# Patient Record
Sex: FEMALE | Race: WHITE | Employment: FULL TIME | URBAN - METROPOLITAN AREA
[De-identification: names, ages, dates, MRNs, and addresses within clinical notes are randomized per-mention and may not be internally consistent; named-entity substitution may affect disease eponyms.]

---

## 2020-11-16 ENCOUNTER — TRANSCRIBE ORDER (OUTPATIENT)
Dept: SCHEDULING | Age: 45
End: 2020-11-16

## 2020-11-16 DIAGNOSIS — Z12.31 VISIT FOR SCREENING MAMMOGRAM: Primary | ICD-10-CM

## 2020-12-09 ENCOUNTER — HOSPITAL ENCOUNTER (OUTPATIENT)
Dept: MAMMOGRAPHY | Age: 45
Discharge: HOME | End: 2020-12-09
Attending: OBSTETRICS & GYNECOLOGY
Payer: COMMERCIAL

## 2020-12-09 DIAGNOSIS — Z12.31 VISIT FOR SCREENING MAMMOGRAM: ICD-10-CM

## 2020-12-09 PROCEDURE — 77067 SCR MAMMO BI INCL CAD: CPT

## 2020-12-15 ENCOUNTER — APPOINTMENT (RX ONLY)
Dept: URBAN - METROPOLITAN AREA CLINIC 349 | Facility: CLINIC | Age: 45
Setting detail: DERMATOLOGY
End: 2020-12-15

## 2020-12-15 DIAGNOSIS — L71.8 OTHER ROSACEA: ICD-10-CM

## 2020-12-15 PROCEDURE — 99202 OFFICE O/P NEW SF 15 MIN: CPT

## 2020-12-15 PROCEDURE — ? PRESCRIPTION

## 2020-12-15 PROCEDURE — ? COUNSELING

## 2020-12-15 PROCEDURE — ? OTHER

## 2020-12-15 RX ORDER — DOXYCYCLINE 100 MG/1
CAPSULE ORAL
Qty: 45 | Refills: 1 | Status: ERX | COMMUNITY
Start: 2020-12-15

## 2020-12-15 RX ORDER — IVERMECTIN 10 MG/G
CREAM TOPICAL
Qty: 1 | Refills: 2 | Status: ERX | COMMUNITY
Start: 2020-12-15

## 2020-12-15 RX ADMIN — DOXYCYCLINE: 100 CAPSULE ORAL at 00:00

## 2020-12-15 RX ADMIN — IVERMECTIN: 10 CREAM TOPICAL at 00:00

## 2020-12-15 NOTE — PROCEDURE: OTHER
Detail Level: Simple
Note Text (......Xxx Chief Complaint.): This diagnosis correlates with the
Other (Free Text): Once Patient has finished oral Doxycline will switch to topical Minocycline Zilixi foam\\nStart Soolantra at night and sampled\\nCerave moistuizer and gentle cleanser sampled

## 2021-01-15 ENCOUNTER — RX ONLY (OUTPATIENT)
Age: 46
Setting detail: RX ONLY
End: 2021-01-15

## 2021-01-15 RX ORDER — METRONIDAZOLE 7.5 MG/G
CREAM TOPICAL
Qty: 1 | Refills: 4 | Status: ERX | COMMUNITY
Start: 2021-01-15

## 2021-02-08 ENCOUNTER — APPOINTMENT (RX ONLY)
Dept: URBAN - METROPOLITAN AREA CLINIC 349 | Facility: CLINIC | Age: 46
Setting detail: DERMATOLOGY
End: 2021-02-08

## 2021-02-08 DIAGNOSIS — L71.8 OTHER ROSACEA: ICD-10-CM | Status: IMPROVED

## 2021-02-08 PROCEDURE — ? COUNSELING

## 2021-02-08 PROCEDURE — ? PRESCRIPTION MEDICATION MANAGEMENT

## 2021-02-08 PROCEDURE — ? PRESCRIPTION

## 2021-02-08 PROCEDURE — 99213 OFFICE O/P EST LOW 20 MIN: CPT

## 2021-02-08 RX ORDER — MINOCYCLINE 15 MG/G
AEROSOL, FOAM TOPICAL
Qty: 1 | Refills: 4 | Status: ERX | COMMUNITY
Start: 2021-02-08

## 2021-02-08 RX ADMIN — MINOCYCLINE: 15 AEROSOL, FOAM TOPICAL at 00:00

## 2021-02-08 ASSESSMENT — LOCATION ZONE DERM: LOCATION ZONE: NOSE

## 2021-02-08 ASSESSMENT — LOCATION DETAILED DESCRIPTION DERM: LOCATION DETAILED: NASAL DORSUM

## 2021-02-08 ASSESSMENT — LOCATION SIMPLE DESCRIPTION DERM: LOCATION SIMPLE: NOSE

## 2021-02-08 NOTE — PROCEDURE: PRESCRIPTION MEDICATION MANAGEMENT
Samples Given: Rhofade sampled for redness\\nZilixi for flares
Plan: Will call in Doxycline if she has bad flare
Detail Level: Simple
Continue Regimen: Metronidazole
Render In Strict Bullet Format?: No

## 2021-10-19 ENCOUNTER — APPOINTMENT (RX ONLY)
Dept: URBAN - METROPOLITAN AREA CLINIC 349 | Facility: CLINIC | Age: 46
Setting detail: DERMATOLOGY
End: 2021-10-19

## 2021-10-19 DIAGNOSIS — L71.8 OTHER ROSACEA: ICD-10-CM | Status: WORSENING

## 2021-10-19 PROCEDURE — ? PRESCRIPTION

## 2021-10-19 PROCEDURE — ? OTHER

## 2021-10-19 PROCEDURE — ? PRESCRIPTION MEDICATION MANAGEMENT

## 2021-10-19 PROCEDURE — 99213 OFFICE O/P EST LOW 20 MIN: CPT

## 2021-10-19 PROCEDURE — ? COUNSELING

## 2021-10-19 RX ORDER — METRONIDAZOLE 7.5 MG/G
CREAM TOPICAL
Qty: 45 | Refills: 3 | Status: ERX | COMMUNITY
Start: 2021-10-19

## 2021-10-19 RX ORDER — DOXYCYCLINE 50 MG/1
CAPSULE ORAL
Qty: 45 | Refills: 0 | Status: ERX | COMMUNITY
Start: 2021-10-19

## 2021-10-19 RX ORDER — MINOCYCLINE 15 MG/G
AEROSOL, FOAM TOPICAL
Qty: 30 | Refills: 4 | Status: ERX | COMMUNITY
Start: 2021-10-19

## 2021-10-19 RX ADMIN — DOXYCYCLINE: 50 CAPSULE ORAL at 00:00

## 2021-10-19 RX ADMIN — MINOCYCLINE: 15 AEROSOL, FOAM TOPICAL at 00:00

## 2021-10-19 RX ADMIN — METRONIDAZOLE: 7.5 CREAM TOPICAL at 00:00

## 2021-10-19 ASSESSMENT — LOCATION DETAILED DESCRIPTION DERM: LOCATION DETAILED: NASAL DORSUM

## 2021-10-19 ASSESSMENT — LOCATION ZONE DERM: LOCATION ZONE: NOSE

## 2021-10-19 ASSESSMENT — LOCATION SIMPLE DESCRIPTION DERM: LOCATION SIMPLE: NOSE

## 2021-10-19 NOTE — PROCEDURE: PRESCRIPTION MEDICATION MANAGEMENT
Plan: Will call in Doxycline if she has bad flare
Detail Level: Simple
Continue Regimen: Metronidazole
Render In Strict Bullet Format?: No

## 2021-10-19 NOTE — PROCEDURE: OTHER
Note Text (......Xxx Chief Complaint.): This diagnosis correlates with the
Detail Level: Zone
Other (Free Text): Patient did not get zilxi.  Has not used rhofade, Redness does not bother her very much.
Render Risk Assessment In Note?: yes

## 2023-01-31 ENCOUNTER — APPOINTMENT (RX ONLY)
Dept: URBAN - METROPOLITAN AREA CLINIC 329 | Facility: CLINIC | Age: 48
Setting detail: DERMATOLOGY
End: 2023-01-31

## 2023-01-31 DIAGNOSIS — L82.1 OTHER SEBORRHEIC KERATOSIS: ICD-10-CM

## 2023-01-31 DIAGNOSIS — L81.4 OTHER MELANIN HYPERPIGMENTATION: ICD-10-CM

## 2023-01-31 DIAGNOSIS — D22 MELANOCYTIC NEVI: ICD-10-CM

## 2023-01-31 PROBLEM — D22.5 MELANOCYTIC NEVI OF TRUNK: Status: ACTIVE | Noted: 2023-01-31

## 2023-01-31 PROCEDURE — ? TREATMENT REGIMEN

## 2023-01-31 PROCEDURE — 99213 OFFICE O/P EST LOW 20 MIN: CPT

## 2023-01-31 PROCEDURE — ? FULL BODY SKIN EXAM

## 2023-01-31 PROCEDURE — ? COUNSELING

## 2023-01-31 ASSESSMENT — LOCATION ZONE DERM
LOCATION ZONE: FACE
LOCATION ZONE: TRUNK
LOCATION ZONE: ARM

## 2023-01-31 ASSESSMENT — LOCATION DETAILED DESCRIPTION DERM
LOCATION DETAILED: LEFT PROXIMAL POSTERIOR UPPER ARM
LOCATION DETAILED: LEFT LATERAL ABDOMEN
LOCATION DETAILED: RIGHT PROXIMAL POSTERIOR UPPER ARM
LOCATION DETAILED: RIGHT SUPERIOR LATERAL UPPER BACK
LOCATION DETAILED: LEFT LATERAL SUPERIOR CHEST
LOCATION DETAILED: RIGHT MEDIAL BREAST 5-6:00 REGION
LOCATION DETAILED: LEFT RIB CAGE
LOCATION DETAILED: LEFT SUPERIOR MEDIAL BUCCAL CHEEK

## 2023-01-31 ASSESSMENT — LOCATION SIMPLE DESCRIPTION DERM
LOCATION SIMPLE: RIGHT BREAST
LOCATION SIMPLE: RIGHT POSTERIOR UPPER ARM
LOCATION SIMPLE: ABDOMEN
LOCATION SIMPLE: LEFT POSTERIOR UPPER ARM
LOCATION SIMPLE: RIGHT BACK
LOCATION SIMPLE: CHEST
LOCATION SIMPLE: LEFT CHEEK

## 2023-01-31 NOTE — PROCEDURE: MIPS QUALITY
Quality 394b: Td/Tdap Immunizations For Adolescents: Patient had one tetanus, diphtheria toxoids and acellular pertussis vaccine (Tdap) on or between the patient's 10th and 13th birthdays.
Detail Level: Detailed
Quality 130: Documentation Of Current Medications In The Medical Record: Current Medications Documented
Quality 394c: Hpv Vaccine For Adolescents: Patient has had at least two HPV vaccines (with at least 146 days between the two) OR three HPV vaccines on or between the patient’s 9th and 13th birthdays.
Quality 402: Tobacco Use And Help With Quitting Among Adolescents: Patient screened for tobacco and never smoked
Quality 110: Preventive Care And Screening: Influenza Immunization: Influenza Immunization Administered during Influenza season
Quality 394a: Meningococcal Immunizations For Adolescents: Patient had one dose of meningococcal vaccine (serogroups A, C, W, Y) on or between the patient's 11th and 13th birthdays.
Quality 431: Preventive Care And Screening: Unhealthy Alcohol Use - Screening: Patient not identified as an unhealthy alcohol user when screened for unhealthy alcohol use using a systematic screening method

## 2023-02-23 ENCOUNTER — HOSPITAL ENCOUNTER (OUTPATIENT)
Dept: MAMMOGRAPHY | Age: 48
End: 2023-02-23
Payer: COMMERCIAL

## 2023-02-23 ENCOUNTER — HOSPITAL ENCOUNTER (OUTPATIENT)
Dept: MAMMOGRAPHY | Age: 48
Discharge: HOME | End: 2023-02-23
Payer: COMMERCIAL

## 2023-02-23 DIAGNOSIS — R92.8 ABNORMAL SCREENING MAMMOGRAM: ICD-10-CM

## 2023-02-23 PROCEDURE — 76642 ULTRASOUND BREAST LIMITED: CPT

## 2023-02-23 PROCEDURE — G0279 TOMOSYNTHESIS, MAMMO: HCPCS

## 2023-02-23 PROCEDURE — 77065 DX MAMMO INCL CAD UNI: CPT

## 2023-03-01 ENCOUNTER — HOSPITAL ENCOUNTER (OUTPATIENT)
Dept: MAMMOGRAPHY | Age: 48
Discharge: HOME | End: 2023-03-04
Payer: COMMERCIAL

## 2023-03-01 VITALS — HEART RATE: 76 BPM | DIASTOLIC BLOOD PRESSURE: 65 MMHG | SYSTOLIC BLOOD PRESSURE: 116 MMHG

## 2023-03-01 DIAGNOSIS — R92.8 ABNORMAL FINDING ON MAMMOGRAPHY: ICD-10-CM

## 2023-03-01 DIAGNOSIS — R92.8 ABNORMAL ULTRASOUND OF BREAST: ICD-10-CM

## 2023-03-01 PROCEDURE — A4648 IMPLANTABLE TISSUE MARKER: HCPCS

## 2023-03-01 PROCEDURE — 88305 TISSUE EXAM BY PATHOLOGIST: CPT

## 2023-03-01 PROCEDURE — 77065 DX MAMMO INCL CAD UNI: CPT

## 2023-03-01 PROCEDURE — 2500000003 HC RX 250 WO HCPCS: Performed by: NURSE PRACTITIONER

## 2023-03-01 RX ORDER — LIDOCAINE HYDROCHLORIDE 10 MG/ML
5 INJECTION, SOLUTION INFILTRATION; PERINEURAL ONCE
Status: COMPLETED | OUTPATIENT
Start: 2023-03-01 | End: 2023-03-01

## 2023-03-01 RX ADMIN — LIDOCAINE HYDROCHLORIDE 5 ML: 10 INJECTION, SOLUTION INFILTRATION; PERINEURAL at 08:22

## 2023-03-01 ASSESSMENT — PAIN SCALES - GENERAL: PAINLEVEL_OUTOF10: 5

## 2023-03-02 ENCOUNTER — TELEPHONE (OUTPATIENT)
Dept: MAMMOGRAPHY | Age: 48
End: 2023-03-02

## 2023-03-02 NOTE — TELEPHONE ENCOUNTER
I spoke with the patient about the results of her recent left breast biopsy, pathology came back as a papilloma. The patient was given the radiologist recommendation to follow up with a surgeon about the discussion of having this area removed.  The patient is scheduled to see Dr. Edmond Louie on 3-8-23 @ 8:30 am.

## 2023-03-08 ENCOUNTER — OFFICE VISIT (OUTPATIENT)
Dept: SURGERY | Age: 48
End: 2023-03-08
Payer: COMMERCIAL

## 2023-03-08 VITALS — BODY MASS INDEX: 22.53 KG/M2 | HEIGHT: 64 IN | WEIGHT: 132 LBS

## 2023-03-08 DIAGNOSIS — D24.2 INTRADUCTAL PAPILLOMA OF LEFT BREAST: Primary | ICD-10-CM

## 2023-03-08 PROCEDURE — 99203 OFFICE O/P NEW LOW 30 MIN: CPT | Performed by: SURGERY

## 2023-03-08 ASSESSMENT — ENCOUNTER SYMPTOMS
EYES NEGATIVE: 1
RESPIRATORY NEGATIVE: 1
GASTROINTESTINAL NEGATIVE: 1
ALLERGIC/IMMUNOLOGIC NEGATIVE: 1

## 2023-03-08 NOTE — PROGRESS NOTES
3/8/2023    Sasha Mckay  MRN: [id number]      CHIEF COMPLAINT: Left breast papilloma. PRIMARY CARE PHYSICIAN: Karin Akins MD      HISTORY:  Underwent breast imaging finding a left breast mass at 4:00. This measured 1.7 x 0.9 x 0.9 cm. Biopsy was done showing fibrocystic changes and a papilloma. DIAGNOSIS        A:  \" LEFT BREAST, 4:00 POSITION, 4 CM FROM NIPPLE, CORE BIOPSY\":         FIBROCYSTIC MASTOPATHY HAVING INTRADUCTAL PAPILLOMA   She denies palpable breast masses or nipple d/c.   REVIEW OF SYSTEMS:  Review of Systems   Constitutional: Negative. HENT: Negative. Eyes: Negative. Respiratory: Negative. Cardiovascular: Negative. Gastrointestinal: Negative. Endocrine: Negative. Genitourinary: Negative. Musculoskeletal: Negative. Skin: Negative. Allergic/Immunologic: Negative. Neurological: Negative. Hematological: Negative. Psychiatric/Behavioral: Negative. History reviewed. No pertinent past medical history. No current outpatient medications on file. No current facility-administered medications for this visit. Family History   Problem Relation Age of Onset    Breast Cancer Maternal Grandmother        Social History     Socioeconomic History    Marital status:      Spouse name: None    Number of children: None    Years of education: None    Highest education level: None         PHYSICAL EXAMINATION:  Physical Exam  Constitutional:       Appearance: Normal appearance. HENT:      Head: Normocephalic and atraumatic. Nose: Nose normal.      Mouth/Throat:      Mouth: Mucous membranes are moist.   Eyes:      Extraocular Movements: Extraocular movements intact. Pupils: Pupils are equal, round, and reactive to light. Cardiovascular:      Rate and Rhythm: Normal rate and regular rhythm. Pulmonary:      Effort: Pulmonary effort is normal.   Chest:   Breasts:     Right: No mass or nipple discharge. Left: No mass or nipple discharge. Comments: Postbiopsy bruising on the left breast 4:00. Abdominal:      Palpations: Abdomen is soft. Musculoskeletal:         General: Normal range of motion. Cervical back: Normal range of motion and neck supple. Lymphadenopathy:      Upper Body:      Right upper body: No supraclavicular or axillary adenopathy. Left upper body: No supraclavicular or axillary adenopathy. Skin:     General: Skin is warm and dry. Neurological:      General: No focal deficit present. Mental Status: She is alert and oriented to person, place, and time. Sensory: Sensation is intact. Psychiatric:         Mood and Affect: Mood normal.         Behavior: Behavior normal.         Thought Content: Thought content normal.        Ht 5' 3.5\" (1.613 m)   Wt 132 lb (59.9 kg)   BMI 23.02 kg/m²       STUDIES:  Mammogram Result (most recent): MAMMOGRAM POST BX CLIP PLACEMENT LEFT 03/01/2023    Narrative  POSTBIOPSY MAMMOGRAM, 3/1/2023. CLINICAL HISTORY: Status post percutaneous biopsy. FINDINGS:    CC, ML views of the left breast demonstrate the biopsy clip in the inferior,  slightly outer soft tissues of the left breast. No significant post biopsy  hematoma is seen. There has been successful deployment of the biopsy clip which  resides within the more posterior and confluent component of asymmetry at this  level. Impression  1. Successful biopsy and placement of marker clip. This is a post biopsy mammogram and does not require BI-RADS categorization. IMPRESSION:  Left breast papilloma. I reviewed with her and recommended a mag seed localized excisional biopsy of the area due to the small risk of an associated malignancy. Reviewed surgery and risks including bleeding, infection, damage to nerves/vessels, scarring, recurrence, need for additional procedures. ASSESSMENT/PLAN:  [facility] was seen today for new patient.     Diagnoses and all orders for this visit:    Intraductal papilloma of left breast      Mag seed localized left breast biopsy.     Judy Ray MD

## 2023-03-13 ENCOUNTER — PREP FOR PROCEDURE (OUTPATIENT)
Dept: SURGERY | Age: 48
End: 2023-03-13

## 2023-03-13 PROBLEM — D24.2 INTRADUCTAL PAPILLOMA OF LEFT BREAST: Status: ACTIVE | Noted: 2023-03-13

## 2023-03-14 ENCOUNTER — TELEPHONE (OUTPATIENT)
Dept: SURGERY | Age: 48
End: 2023-03-14

## 2023-03-14 DIAGNOSIS — D24.2 INTRADUCTAL PAPILLOMA OF LEFT BREAST: Primary | ICD-10-CM

## 2023-03-14 RX ORDER — ONDANSETRON 4 MG/1
4 TABLET, FILM COATED ORAL 3 TIMES DAILY PRN
Qty: 15 TABLET | Refills: 0 | Status: SHIPPED | OUTPATIENT
Start: 2023-03-14

## 2023-03-14 NOTE — TELEPHONE ENCOUNTER
Returned patients call, she states that she gets sick after any procedure using anesthesia. She did not give details of sickness. Lvm to call to elaborate on sick, then I will check with the to see what can be done to help her situation.

## 2023-03-15 ENCOUNTER — TELEPHONE (OUTPATIENT)
Dept: SURGERY | Age: 48
End: 2023-03-15

## 2023-03-15 NOTE — TELEPHONE ENCOUNTER
Patient notified by Diallo Merino.  ----- Message from Sue Lopez MD sent at 3/14/2023  5:25 PM EDT -----  Regarding: RE: nausea  One time script for zofran sent.   ----- Message -----  From: Mercedez Anand MA  Sent: 3/14/2023   1:46 PM EDT  To: Sue Lopez MD  Subject: nausea                                           Patient states after procedures she has horrible nausea after anesthesia. She would like something to help with the nausea. Her pharmacy has been verified in her chart.

## 2023-03-22 ENCOUNTER — HOSPITAL ENCOUNTER (OUTPATIENT)
Dept: MAMMOGRAPHY | Age: 48
Discharge: HOME | End: 2023-03-25
Payer: COMMERCIAL

## 2023-03-22 DIAGNOSIS — D24.2 INTRADUCTAL PAPILLOMA OF LEFT BREAST: ICD-10-CM

## 2023-03-22 DIAGNOSIS — D36.9 PAPILLOMA: ICD-10-CM

## 2023-03-22 PROCEDURE — 2500000003 HC RX 250 WO HCPCS: Performed by: SURGERY

## 2023-03-22 PROCEDURE — 19285 PERQ DEV BREAST 1ST US IMAG: CPT

## 2023-03-22 PROCEDURE — 77065 DX MAMMO INCL CAD UNI: CPT

## 2023-03-22 RX ORDER — LIDOCAINE HYDROCHLORIDE 10 MG/ML
4 INJECTION, SOLUTION INFILTRATION; PERINEURAL ONCE
Status: COMPLETED | OUTPATIENT
Start: 2023-03-22 | End: 2023-03-22

## 2023-03-22 RX ADMIN — LIDOCAINE HYDROCHLORIDE 4 ML: 10 INJECTION, SOLUTION INFILTRATION; PERINEURAL at 09:20

## 2023-03-22 ASSESSMENT — PAIN SCALES - GENERAL: PAINLEVEL_OUTOF10: 1

## 2023-04-14 ENCOUNTER — APPOINTMENT (OUTPATIENT)
Dept: MAMMOGRAPHY | Age: 48
End: 2023-04-14
Attending: SURGERY
Payer: COMMERCIAL

## 2023-04-14 ENCOUNTER — HOSPITAL ENCOUNTER (OUTPATIENT)
Age: 48
Setting detail: OUTPATIENT SURGERY
Discharge: HOME | End: 2023-04-14
Attending: SURGERY | Admitting: SURGERY
Payer: COMMERCIAL

## 2023-04-14 VITALS
RESPIRATION RATE: 18 BRPM | HEIGHT: 64 IN | TEMPERATURE: 97.7 F | WEIGHT: 134.9 LBS | SYSTOLIC BLOOD PRESSURE: 110 MMHG | OXYGEN SATURATION: 99 % | BODY MASS INDEX: 23.03 KG/M2 | HEART RATE: 64 BPM | DIASTOLIC BLOOD PRESSURE: 60 MMHG

## 2023-04-14 DIAGNOSIS — D24.2 INTRADUCTAL PAPILLOMA OF LEFT BREAST: ICD-10-CM

## 2023-04-14 DIAGNOSIS — D24.2 PAPILLOMA OF LEFT BREAST: ICD-10-CM

## 2023-04-14 PROCEDURE — 3700000000 HC ANESTHESIA ATTENDED CARE: Performed by: SURGERY

## 2023-04-14 PROCEDURE — 3600000003 HC SURGERY LEVEL 3 BASE: Performed by: SURGERY

## 2023-04-14 PROCEDURE — 3600000013 HC SURGERY LEVEL 3 ADDTL 15MIN: Performed by: SURGERY

## 2023-04-14 PROCEDURE — 88307 TISSUE EXAM BY PATHOLOGIST: CPT

## 2023-04-14 PROCEDURE — 76098 X-RAY EXAM SURGICAL SPECIMEN: CPT

## 2023-04-14 PROCEDURE — 6370000000 HC RX 637 (ALT 250 FOR IP): Performed by: ANESTHESIOLOGY

## 2023-04-14 PROCEDURE — 7100000001 HC PACU RECOVERY - ADDTL 15 MIN: Performed by: SURGERY

## 2023-04-14 PROCEDURE — 7100000010 HC PHASE II RECOVERY - FIRST 15 MIN: Performed by: SURGERY

## 2023-04-14 PROCEDURE — 7100000011 HC PHASE II RECOVERY - ADDTL 15 MIN: Performed by: SURGERY

## 2023-04-14 PROCEDURE — 2500000003 HC RX 250 WO HCPCS: Performed by: SURGERY

## 2023-04-14 PROCEDURE — 6360000002 HC RX W HCPCS: Performed by: ANESTHESIOLOGY

## 2023-04-14 PROCEDURE — 7100000000 HC PACU RECOVERY - FIRST 15 MIN: Performed by: SURGERY

## 2023-04-14 PROCEDURE — 19125 EXCISION BREAST LESION: CPT | Performed by: SURGERY

## 2023-04-14 PROCEDURE — 2709999900 HC NON-CHARGEABLE SUPPLY: Performed by: SURGERY

## 2023-04-14 PROCEDURE — 3700000001 HC ADD 15 MINUTES (ANESTHESIA): Performed by: SURGERY

## 2023-04-14 PROCEDURE — 2580000003 HC RX 258: Performed by: ANESTHESIOLOGY

## 2023-04-14 RX ORDER — ACETAMINOPHEN 500 MG
1000 TABLET ORAL ONCE
Status: COMPLETED | OUTPATIENT
Start: 2023-04-14 | End: 2023-04-14

## 2023-04-14 RX ORDER — LIDOCAINE HYDROCHLORIDE 10 MG/ML
1 INJECTION, SOLUTION INFILTRATION; PERINEURAL
Status: DISCONTINUED | OUTPATIENT
Start: 2023-04-14 | End: 2023-04-14 | Stop reason: HOSPADM

## 2023-04-14 RX ORDER — ONDANSETRON 4 MG/1
4 TABLET, FILM COATED ORAL 3 TIMES DAILY PRN
Qty: 15 TABLET | Refills: 0 | Status: SHIPPED | OUTPATIENT
Start: 2023-04-14

## 2023-04-14 RX ORDER — BUPIVACAINE HYDROCHLORIDE 5 MG/ML
INJECTION, SOLUTION EPIDURAL; INTRACAUDAL PRN
Status: DISCONTINUED | OUTPATIENT
Start: 2023-04-14 | End: 2023-04-14 | Stop reason: HOSPADM

## 2023-04-14 RX ORDER — MIDAZOLAM HYDROCHLORIDE 2 MG/2ML
2 INJECTION, SOLUTION INTRAMUSCULAR; INTRAVENOUS
Status: COMPLETED | OUTPATIENT
Start: 2023-04-14 | End: 2023-04-14

## 2023-04-14 RX ORDER — SODIUM CHLORIDE 9 MG/ML
INJECTION, SOLUTION INTRAVENOUS PRN
Status: DISCONTINUED | OUTPATIENT
Start: 2023-04-14 | End: 2023-04-14 | Stop reason: HOSPADM

## 2023-04-14 RX ORDER — SODIUM CHLORIDE 0.9 % (FLUSH) 0.9 %
5-40 SYRINGE (ML) INJECTION PRN
Status: DISCONTINUED | OUTPATIENT
Start: 2023-04-14 | End: 2023-04-14 | Stop reason: HOSPADM

## 2023-04-14 RX ORDER — SODIUM CHLORIDE 0.9 % (FLUSH) 0.9 %
5-40 SYRINGE (ML) INJECTION EVERY 12 HOURS SCHEDULED
Status: DISCONTINUED | OUTPATIENT
Start: 2023-04-14 | End: 2023-04-14 | Stop reason: HOSPADM

## 2023-04-14 RX ORDER — ONDANSETRON 2 MG/ML
4 INJECTION INTRAMUSCULAR; INTRAVENOUS
Status: DISCONTINUED | OUTPATIENT
Start: 2023-04-14 | End: 2023-04-14 | Stop reason: HOSPADM

## 2023-04-14 RX ORDER — SODIUM CHLORIDE, SODIUM LACTATE, POTASSIUM CHLORIDE, CALCIUM CHLORIDE 600; 310; 30; 20 MG/100ML; MG/100ML; MG/100ML; MG/100ML
INJECTION, SOLUTION INTRAVENOUS CONTINUOUS
Status: DISCONTINUED | OUTPATIENT
Start: 2023-04-14 | End: 2023-04-14 | Stop reason: HOSPADM

## 2023-04-14 RX ORDER — TRAMADOL HYDROCHLORIDE 50 MG/1
50 TABLET ORAL EVERY 4 HOURS PRN
Qty: 18 TABLET | Refills: 0 | Status: SHIPPED | OUTPATIENT
Start: 2023-04-14 | End: 2023-04-17

## 2023-04-14 RX ORDER — PROCHLORPERAZINE EDISYLATE 5 MG/ML
5 INJECTION INTRAMUSCULAR; INTRAVENOUS
Status: DISCONTINUED | OUTPATIENT
Start: 2023-04-14 | End: 2023-04-14 | Stop reason: HOSPADM

## 2023-04-14 RX ORDER — OXYCODONE HYDROCHLORIDE 5 MG/1
5 TABLET ORAL PRN
Status: DISCONTINUED | OUTPATIENT
Start: 2023-04-14 | End: 2023-04-14 | Stop reason: HOSPADM

## 2023-04-14 RX ORDER — OXYCODONE HYDROCHLORIDE 5 MG/1
10 TABLET ORAL PRN
Status: DISCONTINUED | OUTPATIENT
Start: 2023-04-14 | End: 2023-04-14 | Stop reason: HOSPADM

## 2023-04-14 RX ADMIN — SODIUM CHLORIDE, SODIUM LACTATE, POTASSIUM CHLORIDE, AND CALCIUM CHLORIDE: 600; 310; 30; 20 INJECTION, SOLUTION INTRAVENOUS at 06:48

## 2023-04-14 RX ADMIN — ACETAMINOPHEN 1000 MG: 500 TABLET, FILM COATED ORAL at 06:40

## 2023-04-14 RX ADMIN — MIDAZOLAM HYDROCHLORIDE 2 MG: 1 INJECTION, SOLUTION INTRAMUSCULAR; INTRAVENOUS at 07:50

## 2023-04-14 ASSESSMENT — PAIN DESCRIPTION - ORIENTATION: ORIENTATION: LEFT

## 2023-04-14 ASSESSMENT — PAIN DESCRIPTION - DESCRIPTORS: DESCRIPTORS: DISCOMFORT

## 2023-04-14 ASSESSMENT — PAIN DESCRIPTION - LOCATION: LOCATION: BREAST

## 2023-04-14 ASSESSMENT — PAIN SCALES - GENERAL: PAINLEVEL_OUTOF10: 2

## 2023-04-14 ASSESSMENT — PAIN DESCRIPTION - PAIN TYPE: TYPE: SURGICAL PAIN

## 2023-04-14 NOTE — H&P
CHIEF COMPLAINT: Left breast papilloma. PRIMARY CARE PHYSICIAN: Deborah Muir MD        HISTORY:  Underwent breast imaging finding a left breast mass at 4:00. This measured 1.7 x 0.9 x 0.9 cm. Biopsy was done showing fibrocystic changes and a papilloma. DIAGNOSIS        A:  \" LEFT BREAST, 4:00 POSITION, 4 CM FROM NIPPLE, CORE BIOPSY\":         FIBROCYSTIC MASTOPATHY HAVING INTRADUCTAL PAPILLOMA   She denies palpable breast masses or nipple d/c.     REVIEW OF SYSTEMS:  Review of Systems   Constitutional: Negative. HENT: Negative. Eyes: Negative. Respiratory: Negative. Cardiovascular: Negative. Gastrointestinal: Negative. Endocrine: Negative. Genitourinary: Negative. Musculoskeletal: Negative. Skin: Negative. Allergic/Immunologic: Negative. Neurological: Negative. Hematological: Negative. Psychiatric/Behavioral: Negative. Past Medical History   History reviewed. No pertinent past medical history. Current Facility-Administered Medications   No current outpatient medications on file. No current facility-administered medications for this visit. Family History         Family History   Problem Relation Age of Onset    Breast Cancer Maternal Grandmother              Social History   Social History            Socioeconomic History    Marital status:        Spouse name: None    Number of children: None    Years of education: None    Highest education level: None               PHYSICAL EXAMINATION:  Physical Exam  Constitutional:       Appearance: Normal appearance. HENT:      Head: Normocephalic and atraumatic. Nose: Nose normal.      Mouth/Throat:      Mouth: Mucous membranes are moist.   Eyes:      Extraocular Movements: Extraocular movements intact. Pupils: Pupils are equal, round, and reactive to light. Cardiovascular:      Rate and Rhythm: Normal rate and regular rhythm.    Pulmonary:      Effort: Pulmonary effort

## 2023-04-14 NOTE — DISCHARGE INSTRUCTIONS
Activity: Please take it easy for a couple of days after surgery. Walking is encouraged. The sooner you are up walking the faster your recovery. This will also help prevent constipation and pneumonia. You should avoid lifting, pushing, and/or pulling anything greater than 10 pounds for 2 weeks following surgery (a gallon of milk weighs approximately 8.6 lbs). You may resume work and full activity within 2-4 weeks. Driving- You may drive when you are no longer taking the narcotic pain medication, can quickly move your foot from gas pedal to the brake and turn the steering wheel with both arms. You must also be able to sit comfortably for a long period of time, even if you do not drive far, you may get caught in traffic!!    Diet: You may resume a regular diet. The prescription for pain medication sometimes may cause nausea. If this happens, eat bland foods such as toast or crackers and sip ginger ale. If nausea is severe, please call our office. You may have on a Scopolamine patch placed behind your ear for nausea prevention. The patch is effective for 72 hours after placement. This medication may cause dizziness or blurred vision. The patch may be removed prior to 72 hours if nausea is not present. You may peel it off, being sure to wash hands thoroughly after removal.    Pain: You may experience some pain in the surgical area. A prescription was written for narcotics at the time of your surgery. You may want to use this medication during the first few days of your recovery when youre most likely to have pain. As your pain lessens use less and less of the narcotic pain medicine and begin the switch to Ibuprofen (Advil) and/or Acetaminophen (Tylenol) as it can cause constipation and nausea. Cepacol lozenges are available over the counter which can help soothe any throat irritation. Care of your incisions:  You will have dissolvable stitches and/or Dermabond, which is a type of skin glue, on your

## 2023-04-14 NOTE — DISCHARGE SUMMARY
List    CONTINUE these medications which have NOT CHANGED    NONFORMULARY  Supplements          Current Discharge Medication List        Time Spent on Discharge:  minutes were spent in patient examination, evaluation, counseling as well as medication reconciliation, prescriptions for required medications, discharge plan, and follow up.     Electronically signed by Ольга Posadas MD on 4/14/23 at 8:37 AM EDT

## 2023-04-14 NOTE — BRIEF OP NOTE
Brief Postoperative Note      Patient: Grisel Kong  YOB: 1975  MRN: [id number]    Date of Procedure: 4/14/2023    Pre-Op Diagnosis Codes:     * Intraductal papilloma of left breast [D24.2]    Post-Op Diagnosis: Same       Procedure(s):  MAGSEED LOCALIZED LEFT BREAST BIOPSY    Surgeon(s):  Ольга Bhatia MD    Assistant:  First Assistant: Lisandro Pearl    Anesthesia: General    Estimated Blood Loss (mL): Minimal    Complications: None    Specimens:   ID Type Source Tests Collected by Time Destination   A : left breast biopsy, single stitch anterior, double stitch lateral Tissue Breast SURGICAL PATHOLOGY Ольга Bhatia MD 4/14/2023 3616        Implants:  * No implants in log *      Drains: * No LDAs found *    Findings: Magseed and clip in specimen.       Electronically signed by Ольга Posadas MD on 4/14/2023 at 11:05 AM

## 2023-04-19 ENCOUNTER — OFFICE VISIT (OUTPATIENT)
Dept: SURGERY | Age: 48
End: 2023-04-19

## 2023-04-19 DIAGNOSIS — N60.12 FIBROCYSTIC DISEASE OF LEFT BREAST, PROLIFERATIVE TYPE WITH ATYPIA: ICD-10-CM

## 2023-04-19 DIAGNOSIS — Z80.3 FAMILY HISTORY OF BREAST CANCER: ICD-10-CM

## 2023-04-19 DIAGNOSIS — D24.2 INTRADUCTAL PAPILLOMA OF LEFT BREAST: Primary | ICD-10-CM

## 2023-04-19 PROCEDURE — 99024 POSTOP FOLLOW-UP VISIT: CPT | Performed by: SURGERY

## 2023-04-19 ASSESSMENT — ENCOUNTER SYMPTOMS
RESPIRATORY NEGATIVE: 1
GASTROINTESTINAL NEGATIVE: 1

## 2023-04-19 NOTE — PROGRESS NOTES
Status: She is alert. There were no vitals taken for this visit. IMPRESSION:  Breast papilloma with atypia. Family history of breast cancer. I reviewed the pathology with her. I recommended that we continue with annual mammograms. I will see her following mammogram for breast examination. I will also plan on annual breast MRIs to alternate at 6-month intervals with the mammograms. I also reviewed the possibility of increased risks for breast cancer and consideration for endocrine risk reduction. I would like her to see Dr. Michael Aguilar to discuss. I have placed the referral.  ASSESSMENT/PLAN:  Chet Louis was seen today for follow-up. Diagnoses and all orders for this visit:    Intraductal papilloma of left breast    Fibrocystic disease of left breast, proliferative type with atypia    Family history of breast cancer      Continue annual mammogram. Followup after for breast check.      Ekaterina Russell MD

## 2023-05-18 ENCOUNTER — PATIENT MESSAGE (OUTPATIENT)
Dept: SURGERY | Age: 48
End: 2023-05-18

## 2023-05-19 DIAGNOSIS — D24.2 INTRADUCTAL PAPILLOMA OF LEFT BREAST: Primary | ICD-10-CM

## 2023-05-19 DIAGNOSIS — N60.12 FIBROCYSTIC DISEASE OF LEFT BREAST, PROLIFERATIVE TYPE WITH ATYPIA: ICD-10-CM

## 2023-05-19 DIAGNOSIS — Z80.3 FAMILY HISTORY OF BREAST CANCER: ICD-10-CM

## 2023-05-19 DIAGNOSIS — D24.2 PAPILLOMA OF LEFT BREAST: ICD-10-CM

## 2023-06-01 NOTE — PROGRESS NOTES
New Patient Abstract    Reason for Referral: Intraductal papilloma of left breast, Fibrocystic disease of left breast, proliferative type with atypia, Family history of breast cancer, Papilloma of left breast    Referring Provider: Obdulio Headley MD    Primary Care Provider: Elsi Tan MD     Family History of Cancer/Hematologic Disorders: Family history is significant for maternal Grandmother with breast cancer. Presenting Symptoms: Abnormal results on routine mammogram    Narrative with recent with Results/Procedures/Biopsies and Dates completed: 53 yo female with history of appendectomy, , and hysterectomy presents with newly diagnosed papilloma and family history of breast cancer. Diagnostic digital left tomosynthesis mammography and left breast ultrasound was performed on 2023 which revealed a lobulated heterogeneous hypoechoic lesion measuring about 1.7 x 0.9 x 0.9 cm but not well defined at 4:00 4 cm from the nipple. Recommend ultrasound-guided biopsy. Ultrasound-guided biopsy was performed on 3/1/2023 which revealed left breast, 4:00 position, 4 cm from nipple, core biopsy: fibrocystic mastopathy having intraductal papilloma. She followed up with Surgeon, Dr. Nicolasa Grubbs on 3/8/2023 who recommended a mag seed localized excisional biopsy of the area due to the small risk of an associated malignancy. She underwent a left breast surgical biopsy on 2023 which revealed intraductal papilloma with atypia. Margins are negative for papilloma. Dr. Dora Fermin discussed the possibility of increased risks for breast cancer and consideration for endocrine risk reduction. She is referred to [facility] for Medical Oncology evaluation.     SCREENING DIGITAL MAMMOGRAM WITH CAD 23      DIAGNOSTIC DIGITAL LEFT TOMOSYNTHESIS MAMMOGRAPHY AND LEFT BREAST ULTRASOUND-2023  FINDINGS: Mammogram: Digital diagnostic mammography was performed, and is interpreted in  conjunction with a

## 2023-06-06 ENCOUNTER — OFFICE VISIT (OUTPATIENT)
Dept: ONCOLOGY | Age: 48
End: 2023-06-06
Payer: COMMERCIAL

## 2023-06-06 VITALS
HEIGHT: 64 IN | TEMPERATURE: 98.6 F | RESPIRATION RATE: 14 BRPM | WEIGHT: 138.3 LBS | DIASTOLIC BLOOD PRESSURE: 86 MMHG | OXYGEN SATURATION: 99 % | BODY MASS INDEX: 23.61 KG/M2 | SYSTOLIC BLOOD PRESSURE: 132 MMHG | HEART RATE: 79 BPM

## 2023-06-06 DIAGNOSIS — D24.2 INTRADUCTAL PAPILLOMA OF LEFT BREAST: Primary | ICD-10-CM

## 2023-06-06 DIAGNOSIS — Z91.89 INCREASED RISK OF BREAST CANCER: ICD-10-CM

## 2023-06-06 PROCEDURE — 99204 OFFICE O/P NEW MOD 45 MIN: CPT | Performed by: INTERNAL MEDICINE

## 2023-06-06 ASSESSMENT — PATIENT HEALTH QUESTIONNAIRE - PHQ9
SUM OF ALL RESPONSES TO PHQ QUESTIONS 1-9: 0
SUM OF ALL RESPONSES TO PHQ QUESTIONS 1-9: 0
SUM OF ALL RESPONSES TO PHQ9 QUESTIONS 1 & 2: 0
1. LITTLE INTEREST OR PLEASURE IN DOING THINGS: 0
SUM OF ALL RESPONSES TO PHQ QUESTIONS 1-9: 0
SUM OF ALL RESPONSES TO PHQ QUESTIONS 1-9: 0
2. FEELING DOWN, DEPRESSED OR HOPELESS: 0

## 2023-06-06 NOTE — PROGRESS NOTES
edema and no tenderness. Psych Appropriate mood and affect. Labs:  No results found for this or any previous visit (from the past 24 hour(s)). Pathology:  DIAGNOSIS        \"LEFT BREAST BIOPSY\":             INTRADUCTAL PAPILLOMA WITH ATYPIA. MARGINS ARE NEGATIVE FOR PAPILLOMA. ASSESSMENT:   Diagnosis Orders   1. Intraductal papilloma of left breast        2. Increased risk of breast cancer          Patient Active Problem List   Diagnosis    Intraductal papilloma of left breast           PLAN:  Lab studies and imaging studies were personally reviewed. Pertinent old records were reviewed. Intraductal papilloma: with atypia on excision, no carcinoma noted. History MGM with post-menopausal breast cancer. We discussed risk factors for development of breast cancer, because of her atypical cells and breast biopsy she is at elevated risk of a future breast cancer and would be a candidate for endocrine chemoprevention -0 this would likely lower her risk of breast cancer by 2-3% absolute over the next 5-10 years. She is s/p hysterectomy so her menopausal status is unknown, she has no symptoms suggestive of menopause so I would favor tamoxifen for risk reduction. We discussed the potential risks of therapy including vasomotor symptoms, weight gain, mood symptoms, as well as the low but increased thromboembolic risk. She will consider this and let us know. Continue f/u with Dr. Danny Cervantes for high risk, we will see her back only if she opts to proceed with tamoxifen. All questions were asked and answered to the best of my ability. In all, I spent 45 minutes in the care of Ms. Cristiane Garces today, over 50% of which was in direct counseling and coordination of care.           Liz Spring MD  [facility]  [address] [place] [address]  Office : [phone]  Fax : [phone]

## 2023-06-06 NOTE — PATIENT INSTRUCTIONS
and eventually leads to cell death. The following are antiestrogen medications. tamoxifen, toremifene  Note: We strongly encourage you to talk with your health care professional about your specific medical condition and treatments. The information contained in this website is meant to be helpful and educational, but is not a substitute for medical advice.

## 2024-05-28 ENCOUNTER — HOSPITAL ENCOUNTER (OUTPATIENT)
Dept: MAMMOGRAPHY | Age: 49
Discharge: HOME | End: 2024-05-31
Payer: COMMERCIAL

## 2024-05-28 VITALS — BODY MASS INDEX: 23.92 KG/M2 | WEIGHT: 135 LBS | HEIGHT: 63 IN

## 2024-05-28 DIAGNOSIS — Z12.39 ENCOUNTER FOR OTHER SCREENING FOR MALIGNANT NEOPLASM OF BREAST: ICD-10-CM

## 2024-05-28 PROCEDURE — 77063 BREAST TOMOSYNTHESIS BI: CPT

## (undated) DEVICE — BLADE ES ELASTOMERIC COAT INSUL DURABLE BEND UPTO 90DEG

## (undated) DEVICE — SUTURE PERMA-HAND SZ 2-0 L30IN NONABSORBABLE BLK L26MM SH K833H

## (undated) DEVICE — SUTURE VCRL SZ 3-0 L27IN ABSRB UD L26MM SH 1/2 CIR J416H

## (undated) DEVICE — MINOR SPLIT GENERAL: Brand: MEDLINE INDUSTRIES, INC.

## (undated) DEVICE — NEEDLE HYPO 25GA L1.5IN BLU POLYPR HUB S STL REG BVL STR

## (undated) DEVICE — Device

## (undated) DEVICE — ADHESIVE SKIN CLSR 0.7ML TOP DERMBND ADV

## (undated) DEVICE — GARMENT,MEDLINE,DVT,INT,CALF,MED, GEN2: Brand: MEDLINE

## (undated) DEVICE — GLOVE SURG SZ 75 L12IN FNGR THK79MIL GRN LTX FREE